# Patient Record
Sex: MALE | Race: WHITE | NOT HISPANIC OR LATINO | URBAN - METROPOLITAN AREA
[De-identification: names, ages, dates, MRNs, and addresses within clinical notes are randomized per-mention and may not be internally consistent; named-entity substitution may affect disease eponyms.]

---

## 2025-06-02 ENCOUNTER — APPOINTMENT (OUTPATIENT)
Dept: DERMATOLOGY | Facility: CLINIC | Age: 20
End: 2025-06-02
Payer: COMMERCIAL

## 2025-06-02 DIAGNOSIS — L64.9 ANDROGENIC ALOPECIA: Primary | ICD-10-CM

## 2025-06-02 PROCEDURE — 1036F TOBACCO NON-USER: CPT | Performed by: NURSE PRACTITIONER

## 2025-06-02 PROCEDURE — 99203 OFFICE O/P NEW LOW 30 MIN: CPT | Performed by: NURSE PRACTITIONER

## 2025-06-02 RX ORDER — KETOCONAZOLE 20 MG/G
1 CREAM TOPICAL
COMMUNITY
Start: 2019-09-20

## 2025-06-02 RX ORDER — MINOXIDIL 2.5 MG/1
TABLET ORAL
Qty: 15 TABLET | Refills: 3 | Status: SHIPPED | OUTPATIENT
Start: 2025-06-02

## 2025-06-02 RX ORDER — KETOCONAZOLE 20 MG/ML
SHAMPOO, SUSPENSION TOPICAL
COMMUNITY
Start: 2019-09-20

## 2025-06-02 NOTE — PROGRESS NOTES
Subjective     Rigoberto Navas is a 20 y.o. male who presents for the following: Alopecia.     Review of Systems:  No other skin or systemic complaints other than what is documented elsewhere in the note.    The following portions of the chart were reviewed this encounter and updated as appropriate:   Tobacco  Allergies  Meds  Problems  Med Hx  Surg Hx         Skin Cancer History  Biopsy Log Book  No skin cancers from Specimen Tracking.    Additional History      Specialty Problems    None       Objective   Well appearing patient in no apparent distress; mood and affect are within normal limits.    A focused skin examination was performed. All findings within normal limits unless otherwise noted below.    Assessment/Plan   Skin Exam  1. ANDROGENIC ALOPECIA  Scalp  Hair loss noted to the vertex and frontal scalp with loss of frontal hairline and some bitemporal recession  Father hx - male pattern hair loss.     -Discussed the mechanism of action of androgenetic alopecia, including the slowly progressive nature of the condition and the need for any therapy to be continued long term to maintain results.  -Discussed available therapies and anticipated efficacy, including topical and/or oral and/or injectable treatments.   -Recommend oral minoxidil. Potential side effects included increased growth of facial hair, lightheadedness, dizziness, lowering systolic blood pressure ~5 points. Usually tolerated very well as it is a very low dose of minoxidil. May take 4-6 months to determine benefit.    Following discussion regarding RBSE of topical vs oral minoxidil and finasteride, patient wants to proceed with oral minoxidil. /75. Will start 1.25 mg daily.   Related Procedures  Follow Up In Dermatology - Established Patient  Related Medications  minoxidil (Loniten) 2.5 mg tablet  Take one half tablet (1.25mg) by mouth once daily    Return in 4-6 months for re-check or return to clinic sooner if needed

## 2025-06-02 NOTE — Clinical Note
Hair loss noted to the vertex and frontal scalp with loss of frontal hairline and some bitemporal recession

## 2025-06-02 NOTE — Clinical Note
Father hx - male pattern hair loss.     -Discussed the mechanism of action of androgenetic alopecia, including the slowly progressive nature of the condition and the need for any therapy to be continued long term to maintain results.  -Discussed available therapies and anticipated efficacy, including topical and/or oral and/or injectable treatments.   -Recommend oral minoxidil. Potential side effects included increased growth of facial hair, lightheadedness, dizziness, lowering systolic blood pressure ~5 points. Usually tolerated very well as it is a very low dose of minoxidil. May take 4-6 months to determine benefit.    Following discussion regarding RBSE of topical vs oral minoxidil and finasteride, patient wants to proceed with oral minoxidil. /75. Will start 1.25 mg daily.

## 2025-11-07 ENCOUNTER — APPOINTMENT (OUTPATIENT)
Dept: DERMATOLOGY | Facility: CLINIC | Age: 20
End: 2025-11-07
Payer: COMMERCIAL